# Patient Record
Sex: FEMALE
[De-identification: names, ages, dates, MRNs, and addresses within clinical notes are randomized per-mention and may not be internally consistent; named-entity substitution may affect disease eponyms.]

---

## 2023-09-15 ENCOUNTER — NURSE TRIAGE (OUTPATIENT)
Dept: OTHER | Facility: CLINIC | Age: 33
End: 2023-09-15

## 2023-09-15 NOTE — TELEPHONE ENCOUNTER
Location of patient: [place]    Received call from [address] at [facility]; Patient with Red Flag Complaint requesting to establish care with [facility]. Subjective: Caller states \" Back pain since December \" \"I had been taking muscle relaxers, gabapentin, and steroids. They tried injections but I have pain still\"    Current Symptoms:   States hard to stay in the same position  At times hands and feet tingling- states her previous PCP was not submitting paperwork to insurance appropriately and she is frustrated so looking for another provider    Onset: 9 months ago; gradual    Associated Symptoms: reduced activity    Pain Severity: 6/10; aching; waxing and waning    Temperature: no fever     What has been tried: lidocaine patches, lyrica, cymbalta, tizanidine    LMP:  July 25th  Pregnant: unknown    Recommended disposition: See in Office Within 2 Weeks    Care advice provided, patient verbalizes understanding; denies any other questions or concerns; instructed to call back for any new or worsening symptoms. Patient/Caller agrees with recommended disposition; writer provided warm transfer to [facility] [employer] [facility] [employer] at [facility] for appointment scheduling    Attention Provider: Thank you for allowing me to participate in the care of your patient. The patient was connected to triage in response to information provided to the [facility]. Please do not respond through this encounter as the response is not directed to a shared pool.       Reason for Disposition   Back pain is a chronic symptom (recurrent or ongoing AND lasting > 4 weeks)    Protocols used: Back Pain-ADULT-[place]

## 2023-09-18 ENCOUNTER — NURSE TRIAGE (OUTPATIENT)
Dept: OTHER | Facility: CLINIC | Age: 33
End: 2023-09-18

## 2023-09-18 SDOH — HEALTH STABILITY: PHYSICAL HEALTH: ON AVERAGE, HOW MANY DAYS PER WEEK DO YOU ENGAGE IN MODERATE TO STRENUOUS EXERCISE (LIKE A BRISK WALK)?: 4 DAYS

## 2023-09-18 SDOH — HEALTH STABILITY: PHYSICAL HEALTH: ON AVERAGE, HOW MANY MINUTES DO YOU ENGAGE IN EXERCISE AT THIS LEVEL?: 60 MIN

## 2023-09-18 ASSESSMENT — SOCIAL DETERMINANTS OF HEALTH (SDOH)
WITHIN THE LAST YEAR, HAVE YOU BEEN HUMILIATED OR EMOTIONALLY ABUSED IN OTHER WAYS BY YOUR PARTNER OR EX-PARTNER?: NO
WITHIN THE LAST YEAR, HAVE YOU BEEN AFRAID OF YOUR PARTNER OR EX-PARTNER?: NO
WITHIN THE LAST YEAR, HAVE TO BEEN RAPED OR FORCED TO HAVE ANY KIND OF SEXUAL ACTIVITY BY YOUR PARTNER OR EX-PARTNER?: NO
WITHIN THE LAST YEAR, HAVE YOU BEEN KICKED, HIT, SLAPPED, OR OTHERWISE PHYSICALLY HURT BY YOUR PARTNER OR EX-PARTNER?: NO

## 2023-09-18 NOTE — TELEPHONE ENCOUNTER
Location of patient: [place]    Received call from [facility] at [facility]; Patient with [employer] Complaint requesting to establish care with [place]. Current Symptoms: Mid back pain, dizziness, weakness in bilateral legs     States was evaluated by PT on 9/8/2023     Sana has been evaluated by other providers for similar symptoms and would like a new PCP    Onset: 10 months ago;     Pain Severity: 9/10    Temperature: denies     What has been tried: tylenol, ibuprofen, lidocaine patches, gabapentin, Cymbalta    Pregnant: No    Denies - fainting / numbness or weakness on one side of the body / visual changes / vomiting     Recommended disposition: See in Office Within 2 Weeks    Care advice provided, patient verbalizes understanding; denies any other questions or concerns; instructed to call back for any new or worsening symptoms. Patient/Caller agrees with recommended disposition; writer provided warm transfer to [facility] at [facility] for appointment scheduling    Attention Provider: Thank you for allowing me to participate in the care of your patient. The patient was connected to triage in response to information provided to the [facility]. Please do not respond through this encounter as the response is not directed to a shared pool.       Reason for Disposition   Dizziness not present now, but is a chronic symptom (recurrent or ongoing AND lasting > 4 weeks)    Protocols used: Dizziness-ADULT-[place]

## 2023-09-20 ENCOUNTER — OFFICE VISIT (OUTPATIENT)
Dept: FAMILY MEDICINE CLINIC | Age: 33
End: 2023-09-20
Payer: MEDICAID

## 2023-09-20 VITALS
HEART RATE: 81 BPM | WEIGHT: 107 LBS | SYSTOLIC BLOOD PRESSURE: 110 MMHG | DIASTOLIC BLOOD PRESSURE: 66 MMHG | OXYGEN SATURATION: 99 %

## 2023-09-20 DIAGNOSIS — M54.6 CHRONIC BILATERAL THORACIC BACK PAIN: Primary | ICD-10-CM

## 2023-09-20 DIAGNOSIS — G89.29 CHRONIC RADICULAR CERVICAL PAIN: ICD-10-CM

## 2023-09-20 DIAGNOSIS — G89.29 CHRONIC BILATERAL THORACIC BACK PAIN: Primary | ICD-10-CM

## 2023-09-20 DIAGNOSIS — M54.12 CHRONIC RADICULAR CERVICAL PAIN: ICD-10-CM

## 2023-09-20 PROCEDURE — G8421 BMI NOT CALCULATED: HCPCS

## 2023-09-20 PROCEDURE — 99204 OFFICE O/P NEW MOD 45 MIN: CPT

## 2023-09-20 PROCEDURE — 4004F PT TOBACCO SCREEN RCVD TLK: CPT

## 2023-09-20 PROCEDURE — G8427 DOCREV CUR MEDS BY ELIG CLIN: HCPCS

## 2023-09-20 RX ORDER — LIDOCAINE 50 MG/G
1 PATCH TOPICAL DAILY
COMMUNITY
Start: 2023-04-29

## 2023-09-20 RX ORDER — PREGABALIN 150 MG/1
150 CAPSULE ORAL 2 TIMES DAILY
COMMUNITY
Start: 2023-09-12

## 2023-09-20 RX ORDER — DULOXETIN HYDROCHLORIDE 30 MG/1
30 CAPSULE, DELAYED RELEASE ORAL DAILY
COMMUNITY
Start: 2023-08-29

## 2023-09-20 RX ORDER — TIZANIDINE 4 MG/1
4 TABLET ORAL DAILY
COMMUNITY
Start: 2023-08-29

## 2023-09-20 SDOH — ECONOMIC STABILITY: FOOD INSECURITY: WITHIN THE PAST 12 MONTHS, THE FOOD YOU BOUGHT JUST DIDN'T LAST AND YOU DIDN'T HAVE MONEY TO GET MORE.: NEVER TRUE

## 2023-09-20 SDOH — ECONOMIC STABILITY: FOOD INSECURITY: WITHIN THE PAST 12 MONTHS, YOU WORRIED THAT YOUR FOOD WOULD RUN OUT BEFORE YOU GOT MONEY TO BUY MORE.: NEVER TRUE

## 2023-09-20 SDOH — ECONOMIC STABILITY: INCOME INSECURITY: HOW HARD IS IT FOR YOU TO PAY FOR THE VERY BASICS LIKE FOOD, HOUSING, MEDICAL CARE, AND HEATING?: NOT HARD AT ALL

## 2023-09-20 SDOH — ECONOMIC STABILITY: HOUSING INSECURITY
IN THE LAST 12 MONTHS, WAS THERE A TIME WHEN YOU DID NOT HAVE A STEADY PLACE TO SLEEP OR SLEPT IN A SHELTER (INCLUDING NOW)?: NO

## 2023-09-20 ASSESSMENT — ENCOUNTER SYMPTOMS
SHORTNESS OF BREATH: 0
BACK PAIN: 1
ABDOMINAL PAIN: 0
COUGH: 0
CHEST TIGHTNESS: 0
BOWEL INCONTINENCE: 0

## 2023-09-20 NOTE — PROGRESS NOTES
[address] [facility] (:  1990) is a 35 y.o. female,Established patient, here for evaluation of the following chief complaint(s):  New Patient (Patient is here today to establish. She does have a history of issues with her spine. She did see Rheumatology and records are in care everywhere.) and Back Pain         ASSESSMENT/PLAN:  1. Chronic bilateral thoracic back pain  -     MRI THORACIC SPINE WO CONTRAST; Future  -     MRI CERVICAL SPINE WO CONTRAST; Future  2. Chronic radicular cervical pain  -     MRI CERVICAL SPINE WO CONTRAST; Future  -Here to establish, rheumatology as well as her prior PCP results are reviewed with her in office today. Does follow with rheumatology which is diagnosed her with she states ankylosing spondylitis however no imaging supported this just laboratory studies, did give her injections into her SI joints without any relief, her prior PCP wanted to do MRI of her T-spine and cervical due to her continuing complaints of pain in the area with referred numbness and tingling and paresthesia down bilateral arms. Motor function is not inhibited in any way, she has good strength bilateral hand grasps, deficits only include reports of paresthesia. Paresthesia is worse with some neck movement and range of motion both passive and active. Due to this will order MRI of cervical and thoracic spine. No follow-ups on file. Subjective   SUBJECTIVE/OBJECTIVE:  Back Pain  This is a chronic problem. The current episode started more than 1 month ago (december it became exacerbated). The problem occurs constantly. The problem is unchanged. The pain is present in the thoracic spine, lumbar spine and sacro-iliac. The quality of the pain is described as burning and stabbing. The pain radiates to the right thigh and left thigh. The pain is severe. The pain is The same all the time. The symptoms are aggravated by bending, twisting and stress.  Associated symptoms include numbness, paresthesias

## 2023-10-03 ENCOUNTER — HOSPITAL ENCOUNTER (OUTPATIENT)
Dept: MRI IMAGING | Age: 33
Discharge: HOME | End: 2023-10-05
Payer: MEDICAID

## 2023-10-03 DIAGNOSIS — G89.29 CHRONIC BILATERAL THORACIC BACK PAIN: ICD-10-CM

## 2023-10-03 DIAGNOSIS — G89.29 CHRONIC RADICULAR CERVICAL PAIN: ICD-10-CM

## 2023-10-03 DIAGNOSIS — M54.6 CHRONIC BILATERAL THORACIC BACK PAIN: ICD-10-CM

## 2023-10-03 DIAGNOSIS — M54.12 CHRONIC RADICULAR CERVICAL PAIN: ICD-10-CM

## 2023-10-03 PROCEDURE — 72146 MRI CHEST SPINE W/O DYE: CPT

## 2023-10-03 PROCEDURE — 72141 MRI NECK SPINE W/O DYE: CPT

## 2023-10-07 ASSESSMENT — PATIENT HEALTH QUESTIONNAIRE - PHQ9
5. POOR APPETITE OR OVEREATING: 3
2. FEELING DOWN, DEPRESSED OR HOPELESS: SEVERAL DAYS
4. FEELING TIRED OR HAVING LITTLE ENERGY: 3
2. FEELING DOWN, DEPRESSED OR HOPELESS: 1
10. IF YOU CHECKED OFF ANY PROBLEMS, HOW DIFFICULT HAVE THESE PROBLEMS MADE IT FOR YOU TO DO YOUR WORK, TAKE CARE OF THINGS AT HOME, OR GET ALONG WITH OTHER PEOPLE: 2
SUM OF ALL RESPONSES TO PHQ QUESTIONS 1-9: 14
6. FEELING BAD ABOUT YOURSELF - OR THAT YOU ARE A FAILURE OR HAVE LET YOURSELF OR YOUR FAMILY DOWN: 1
6. FEELING BAD ABOUT YOURSELF - OR THAT YOU ARE A FAILURE OR HAVE LET YOURSELF OR YOUR FAMILY DOWN: SEVERAL DAYS
7. TROUBLE CONCENTRATING ON THINGS, SUCH AS READING THE NEWSPAPER OR WATCHING TELEVISION: 1
9. THOUGHTS THAT YOU WOULD BE BETTER OFF DEAD, OR OF HURTING YOURSELF: 0
4. FEELING TIRED OR HAVING LITTLE ENERGY: NEARLY EVERY DAY
7. TROUBLE CONCENTRATING ON THINGS, SUCH AS READING THE NEWSPAPER OR WATCHING TELEVISION: SEVERAL DAYS
10. IF YOU CHECKED OFF ANY PROBLEMS, HOW DIFFICULT HAVE THESE PROBLEMS MADE IT FOR YOU TO DO YOUR WORK, TAKE CARE OF THINGS AT HOME, OR GET ALONG WITH OTHER PEOPLE: VERY DIFFICULT
3. TROUBLE FALLING OR STAYING ASLEEP: 2
SUM OF ALL RESPONSES TO PHQ9 QUESTIONS 1 & 2: 3
SUM OF ALL RESPONSES TO PHQ9 QUESTIONS 1 & 2: 3
8. MOVING OR SPEAKING SO SLOWLY THAT OTHER PEOPLE COULD HAVE NOTICED. OR THE OPPOSITE, BEING SO FIGETY OR RESTLESS THAT YOU HAVE BEEN MOVING AROUND A LOT MORE THAN USUAL: 1
1. LITTLE INTEREST OR PLEASURE IN DOING THINGS: MORE THAN HALF THE DAYS
9. THOUGHTS THAT YOU WOULD BE BETTER OFF DEAD, OR OF HURTING YOURSELF: NOT AT ALL
8. MOVING OR SPEAKING SO SLOWLY THAT OTHER PEOPLE COULD HAVE NOTICED. OR THE OPPOSITE - BEING SO FIDGETY OR RESTLESS THAT YOU HAVE BEEN MOVING AROUND A LOT MORE THAN USUAL: SEVERAL DAYS
5. POOR APPETITE OR OVEREATING: NEARLY EVERY DAY
SUM OF ALL RESPONSES TO PHQ QUESTIONS 1-9: 14
1. LITTLE INTEREST OR PLEASURE IN DOING THINGS: 2
SUM OF ALL RESPONSES TO PHQ QUESTIONS 1-9: 14
3. TROUBLE FALLING OR STAYING ASLEEP: MORE THAN HALF THE DAYS

## 2023-10-09 ENCOUNTER — OFFICE VISIT (OUTPATIENT)
Dept: FAMILY MEDICINE CLINIC | Age: 33
End: 2023-10-09
Payer: MEDICAID

## 2023-10-09 VITALS
DIASTOLIC BLOOD PRESSURE: 60 MMHG | OXYGEN SATURATION: 100 % | WEIGHT: 107 LBS | HEART RATE: 79 BPM | SYSTOLIC BLOOD PRESSURE: 120 MMHG

## 2023-10-09 DIAGNOSIS — M54.6 PAIN IN THORACIC SPINE AT MULTIPLE SITES: Primary | ICD-10-CM

## 2023-10-09 PROCEDURE — 4004F PT TOBACCO SCREEN RCVD TLK: CPT

## 2023-10-09 PROCEDURE — 99212 OFFICE O/P EST SF 10 MIN: CPT

## 2023-10-09 PROCEDURE — G8421 BMI NOT CALCULATED: HCPCS

## 2023-10-09 PROCEDURE — G8484 FLU IMMUNIZE NO ADMIN: HCPCS

## 2023-10-09 PROCEDURE — G8427 DOCREV CUR MEDS BY ELIG CLIN: HCPCS

## 2023-10-09 ASSESSMENT — ENCOUNTER SYMPTOMS
ABDOMINAL PAIN: 0
SHORTNESS OF BREATH: 0
BACK PAIN: 1
COUGH: 0

## 2023-10-09 NOTE — PROGRESS NOTES
[address] [facility] (:  1990) is a 35 y.o. female,Established patient, here for evaluation of the following chief complaint(s):  Back Pain (Patient is here today to follow up for MRI results. )         ASSESSMENT/PLAN:  1. Pain in thoracic spine at multiple sites  -     Tomasa Garcia, CNP, Pain Management, [place]  - normal MRI of neck and upper back. Despite this having chronic pain we will send to pain management for further treatment options. Return in about 6 months (around 2024), or if symptoms worsen or fail to improve. Subjective   SUBJECTIVE/OBJECTIVE:  Back Pain  This is a chronic problem. The current episode started more than 1 year ago. The problem occurs daily. The problem is unchanged. The pain is present in the lumbar spine and thoracic spine (cervical). The quality of the pain is described as aching. The pain is moderate. The pain is The same all the time. The symptoms are aggravated by bending, twisting, standing and stress. Pertinent negatives include no abdominal pain, bladder incontinence, bowel incontinence, chest pain, dysuria, fever, pelvic pain, perianal numbness or weakness. She has tried chiropractic manipulation, NSAIDs and muscle relaxant for the symptoms. The treatment provided mild relief. Review of Systems   Constitutional:  Negative for chills, fatigue and fever. HENT:  Negative for congestion. Respiratory:  Negative for cough and shortness of breath. Cardiovascular:  Negative for chest pain. Gastrointestinal:  Negative for abdominal pain and bowel incontinence. Genitourinary:  Negative for bladder incontinence, dysuria and pelvic pain. Musculoskeletal:  Positive for arthralgias, back pain and neck pain. Neurological:  Negative for weakness. Objective   Physical Exam  Vitals and nursing note reviewed. Constitutional:       Appearance: Normal appearance. HENT:      Head: Normocephalic.       Right Ear: Tympanic membrane and

## 2023-10-11 ASSESSMENT — ENCOUNTER SYMPTOMS: BOWEL INCONTINENCE: 0

## 2023-11-03 ENCOUNTER — TELEPHONE (OUTPATIENT)
Dept: FAMILY MEDICINE CLINIC | Age: 33
End: 2023-11-03

## 2023-11-03 NOTE — TELEPHONE ENCOUNTER
Remedios Esquivel, on behalf of [employer]erto Villanueva, is calling for a [id number] for an upcoming thoracic spine MRI. Please call [phone] for the peer to peer.

## 2023-11-06 NOTE — TELEPHONE ENCOUNTER
Spoke with the insurance again and they would like all PT notes faxed to them at [phone]. Tracking number [id number]. This calls reference number was [id number].

## 2023-11-06 NOTE — TELEPHONE ENCOUNTER
Spoke with the [facility] department and they did say it was retro denied and may still require a peer to peer. I then spoke with the number in the previous message. They were able to set up a peer to peer today that was done with Rakel Chapman.

## 2024-01-16 ENCOUNTER — OFFICE VISIT (OUTPATIENT)
Dept: FAMILY MEDICINE CLINIC | Age: 34
End: 2024-01-16
Payer: COMMERCIAL

## 2024-01-16 VITALS
TEMPERATURE: 97.8 F | DIASTOLIC BLOOD PRESSURE: 64 MMHG | OXYGEN SATURATION: 98 % | SYSTOLIC BLOOD PRESSURE: 122 MMHG | BODY MASS INDEX: 19.15 KG/M2 | WEIGHT: 112.2 LBS | RESPIRATION RATE: 16 BRPM | HEART RATE: 89 BPM | HEIGHT: 64 IN

## 2024-01-16 DIAGNOSIS — G89.29 CHRONIC PELVIC PAIN IN FEMALE: ICD-10-CM

## 2024-01-16 DIAGNOSIS — Z91.89 ENCOUNTER FOR HEPATITIS C VIRUS SCREENING TEST FOR HIGH RISK PATIENT: ICD-10-CM

## 2024-01-16 DIAGNOSIS — Z11.59 ENCOUNTER FOR HEPATITIS C VIRUS SCREENING TEST FOR HIGH RISK PATIENT: ICD-10-CM

## 2024-01-16 DIAGNOSIS — R10.2 CHRONIC PELVIC PAIN IN FEMALE: ICD-10-CM

## 2024-01-16 DIAGNOSIS — M54.6 PAIN IN THORACIC SPINE AT MULTIPLE SITES: ICD-10-CM

## 2024-01-16 DIAGNOSIS — M79.7 FIBROMYALGIA: ICD-10-CM

## 2024-01-16 DIAGNOSIS — K21.9 GASTROESOPHAGEAL REFLUX DISEASE WITHOUT ESOPHAGITIS: Primary | ICD-10-CM

## 2024-01-16 DIAGNOSIS — Z72.0 TOBACCO ABUSE: ICD-10-CM

## 2024-01-16 DIAGNOSIS — R59.1 LYMPHADENOPATHY: ICD-10-CM

## 2024-01-16 DIAGNOSIS — Z23 NEED FOR STREPTOCOCCUS PNEUMONIAE VACCINATION: ICD-10-CM

## 2024-01-16 DIAGNOSIS — R06.2 WHEEZING: ICD-10-CM

## 2024-01-16 DIAGNOSIS — F51.5 NIGHTMARES: ICD-10-CM

## 2024-01-16 PROCEDURE — G8427 DOCREV CUR MEDS BY ELIG CLIN: HCPCS | Performed by: NURSE PRACTITIONER

## 2024-01-16 PROCEDURE — G8420 CALC BMI NORM PARAMETERS: HCPCS | Performed by: NURSE PRACTITIONER

## 2024-01-16 PROCEDURE — 99212 OFFICE O/P EST SF 10 MIN: CPT | Performed by: NURSE PRACTITIONER

## 2024-01-16 PROCEDURE — 4004F PT TOBACCO SCREEN RCVD TLK: CPT | Performed by: NURSE PRACTITIONER

## 2024-01-16 PROCEDURE — 99214 OFFICE O/P EST MOD 30 MIN: CPT | Performed by: NURSE PRACTITIONER

## 2024-01-16 PROCEDURE — G8484 FLU IMMUNIZE NO ADMIN: HCPCS | Performed by: NURSE PRACTITIONER

## 2024-01-16 RX ORDER — MIRTAZAPINE 7.5 MG/1
7.5 TABLET, FILM COATED ORAL NIGHTLY
COMMUNITY
Start: 2023-12-29

## 2024-01-16 RX ORDER — PRAZOSIN HYDROCHLORIDE 1 MG/1
CAPSULE ORAL
COMMUNITY
Start: 2023-12-29

## 2024-01-16 RX ORDER — DULOXETIN HYDROCHLORIDE 60 MG/1
60 CAPSULE, DELAYED RELEASE ORAL DAILY
COMMUNITY
Start: 2024-01-04

## 2024-01-16 RX ORDER — NAPROXEN 500 MG/1
TABLET ORAL
COMMUNITY
Start: 2023-12-18

## 2024-01-16 RX ORDER — ALBUTEROL SULFATE 90 UG/1
2 AEROSOL, METERED RESPIRATORY (INHALATION) EVERY 4 HOURS PRN
Qty: 18 G | Refills: 0 | Status: SHIPPED | OUTPATIENT
Start: 2024-01-16

## 2024-01-16 RX ORDER — OMEPRAZOLE 40 MG/1
CAPSULE, DELAYED RELEASE ORAL
COMMUNITY

## 2024-01-16 ASSESSMENT — PATIENT HEALTH QUESTIONNAIRE - PHQ9
5. POOR APPETITE OR OVEREATING: 0
6. FEELING BAD ABOUT YOURSELF - OR THAT YOU ARE A FAILURE OR HAVE LET YOURSELF OR YOUR FAMILY DOWN: 1
10. IF YOU CHECKED OFF ANY PROBLEMS, HOW DIFFICULT HAVE THESE PROBLEMS MADE IT FOR YOU TO DO YOUR WORK, TAKE CARE OF THINGS AT HOME, OR GET ALONG WITH OTHER PEOPLE: 3
7. TROUBLE CONCENTRATING ON THINGS, SUCH AS READING THE NEWSPAPER OR WATCHING TELEVISION: 1
SUM OF ALL RESPONSES TO PHQ QUESTIONS 1-9: 10
8. MOVING OR SPEAKING SO SLOWLY THAT OTHER PEOPLE COULD HAVE NOTICED. OR THE OPPOSITE, BEING SO FIGETY OR RESTLESS THAT YOU HAVE BEEN MOVING AROUND A LOT MORE THAN USUAL: 1
3. TROUBLE FALLING OR STAYING ASLEEP: 3
SUM OF ALL RESPONSES TO PHQ QUESTIONS 1-9: 10
SUM OF ALL RESPONSES TO PHQ QUESTIONS 1-9: 10
1. LITTLE INTEREST OR PLEASURE IN DOING THINGS: 1
4. FEELING TIRED OR HAVING LITTLE ENERGY: 3
2. FEELING DOWN, DEPRESSED OR HOPELESS: 0
9. THOUGHTS THAT YOU WOULD BE BETTER OFF DEAD, OR OF HURTING YOURSELF: 0
SUM OF ALL RESPONSES TO PHQ QUESTIONS 1-9: 10
SUM OF ALL RESPONSES TO PHQ9 QUESTIONS 1 & 2: 1

## 2024-01-16 NOTE — PATIENT INSTRUCTIONS
Have labs done.  You will be called for an appointment from OBGYN.  Follow up with primary care provider in 1 to 2 days if needed.  Albuterol 2 puffs every 4 to 6 hours as needed for cough, wheeze or shortness of breath.

## 2024-01-16 NOTE — ASSESSMENT & PLAN NOTE
Uncontrolled, lifestyle modifications recommended and will start albuterol prn for wheezing.  Encouraged to stop smoking.

## 2024-01-16 NOTE — ASSESSMENT & PLAN NOTE
Normal cervical lymph tissue anteriorly and posteriorly.  Small 3 mm area of enlargement in the right groin.  No axillary adenopathy.

## 2024-01-16 NOTE — PROGRESS NOTES
Renetta Vasquez, APRN-CNP  [place] [id number]  [phone]        1/16/2024     Domitila Juarez is a 33 y.o. female, here for evaluation of the following medical concerns:    Chief Complaint   Patient presents with    Lymphadenopathy     Under left arm and groin.  Sx for 5 months- started with neck.        HPI  For 5 months she has had enlarged lymph nodes in the neck.  These were ultrasounded and was told they were reactive.  For the last week she has had some lymph swelling under the arms and in the inguinal region.  She is seeing a rheumatologist.  Diagnosed with Fibromyalgia.  Question of Sjogren's.     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  Patient sees rheumatology, ENT and psychiatry.    She also has chronic pelvic pain.  She is unhappy with her GYN and would like to change to another provider.  Patient Active Problem List   Diagnosis    Pain in thoracic spine at multiple sites    Lymphadenopathy    Gastroesophageal reflux disease without esophagitis    Fibromyalgia    Nightmares    Tobacco abuse    Chronic pelvic pain in female       Patient's recent lab reports are as follows:      Results for orders placed or performed in visit on 11/16/23   Pregnancy, Urine   Result Value Ref Range    HCG Urine Negative Negative     Other review of systems are as noted below.    Preventative measures are reviewed today. See health maintenance section for complete preventative plan of care.    Did review patient's med list, allergies, social history, fam history, pmhx and pshx today as noted in the record.      Review of Systems      Prior to Visit Medications    Medication Sig Taking? Authorizing Provider   DULoxetine (CYMBALTA) 60 MG extended release capsule Take 1 capsule by mouth daily Yes Provider, Cleve, MD   omeprazole (PRILOSEC) 40 MG delayed release capsule take 1 capsule by mouth 30 MINUTES before LUNCH or dinner daily Yes Provider, Historical,  no

## 2024-01-16 NOTE — ASSESSMENT & PLAN NOTE
Uncontrolled, patient is unsatisfied with her current provider.  [facility]

## 2024-02-06 ENCOUNTER — OFFICE VISIT (OUTPATIENT)
Dept: FAMILY MEDICINE CLINIC | Age: 34
End: 2024-02-06
Payer: COMMERCIAL

## 2024-02-06 VITALS
DIASTOLIC BLOOD PRESSURE: 64 MMHG | SYSTOLIC BLOOD PRESSURE: 120 MMHG | BODY MASS INDEX: 20.05 KG/M2 | WEIGHT: 115 LBS | OXYGEN SATURATION: 98 % | HEART RATE: 86 BPM

## 2024-02-06 DIAGNOSIS — R59.9 REACTIVE LYMPHADENOPATHY: ICD-10-CM

## 2024-02-06 DIAGNOSIS — M79.7 FIBROMYALGIA: Primary | ICD-10-CM

## 2024-02-06 DIAGNOSIS — M54.6 PAIN IN THORACIC SPINE AT MULTIPLE SITES: ICD-10-CM

## 2024-02-06 PROCEDURE — G8427 DOCREV CUR MEDS BY ELIG CLIN: HCPCS | Performed by: NURSE PRACTITIONER

## 2024-02-06 PROCEDURE — 4004F PT TOBACCO SCREEN RCVD TLK: CPT | Performed by: NURSE PRACTITIONER

## 2024-02-06 PROCEDURE — G8484 FLU IMMUNIZE NO ADMIN: HCPCS | Performed by: NURSE PRACTITIONER

## 2024-02-06 PROCEDURE — G8420 CALC BMI NORM PARAMETERS: HCPCS | Performed by: NURSE PRACTITIONER

## 2024-02-06 PROCEDURE — 99212 OFFICE O/P EST SF 10 MIN: CPT | Performed by: NURSE PRACTITIONER

## 2024-02-06 PROCEDURE — 99214 OFFICE O/P EST MOD 30 MIN: CPT | Performed by: NURSE PRACTITIONER

## 2024-02-06 RX ORDER — PREDNISONE 20 MG/1
40 TABLET ORAL EVERY MORNING
COMMUNITY
Start: 2024-02-02

## 2024-02-06 ASSESSMENT — ENCOUNTER SYMPTOMS: BACK PAIN: 1

## 2024-02-06 NOTE — PROGRESS NOTES
Readings from Last 3 Encounters:   No data found for PF     @LASTSAO2(3)@   Estimated body mass index is 20.05 kg/m² as calculated from the following:    Height as of 1/16/24: 1.613 m (5' 3.5\").    Weight as of this encounter: 52.2 kg (115 lb).       1/16/2024     9:29 AM 10/7/2023    12:03 PM   PHQ-9    Little interest or pleasure in doing things 1 2   Feeling down, depressed, or hopeless 0 1   Trouble falling or staying asleep, or sleeping too much 3 2   Feeling tired or having little energy 3 3   Poor appetite or overeating 0 3   Feeling bad about yourself - or that you are a failure or have let yourself or your family down 1 1   Trouble concentrating on things, such as reading the newspaper or watching television 1 1   Moving or speaking so slowly that other people could have noticed. Or the opposite - being so fidgety or restless that you have been moving around a lot more than usual 1 1   Thoughts that you would be better off dead, or of hurting yourself in some way 0 0   PHQ-2 Score 1 3   PHQ-9 Total Score 10 14   If you checked off any problems, how difficult have these problems made it for you to do your work, take care of things at home, or get along with other people? 3 2           No data to display                   Physical Exam  Vitals and nursing note reviewed.   Constitutional:       Appearance: Normal appearance.   HENT:      Head: Normocephalic.      Right Ear: External ear normal.      Left Ear: External ear normal.   Eyes:      Extraocular Movements: Extraocular movements intact.   Pulmonary:      Effort: Pulmonary effort is normal. No respiratory distress.   Neurological:      General: No focal deficit present.      Mental Status: She is alert and oriented to person, place, and time.           ASSESSMENT/PLAN:    1. Fibromyalgia  -     Josias Castañeda MD, Rheumatology, [place]  2. Reactive lymphadenopathy  -     Josias Castañeda MD, Rheumatology, [place]  3. Pain in thoracic spine at multiple

## 2024-02-08 ENCOUNTER — HOSPITAL ENCOUNTER (OUTPATIENT)
Age: 34
Discharge: HOME | End: 2024-02-08
Payer: COMMERCIAL

## 2024-02-08 ENCOUNTER — OFFICE VISIT (OUTPATIENT)
Dept: OBGYN | Age: 34
End: 2024-02-08
Payer: COMMERCIAL

## 2024-02-08 VITALS
OXYGEN SATURATION: 98 % | HEART RATE: 113 BPM | HEIGHT: 63 IN | SYSTOLIC BLOOD PRESSURE: 124 MMHG | DIASTOLIC BLOOD PRESSURE: 88 MMHG | RESPIRATION RATE: 14 BRPM | BODY MASS INDEX: 20.41 KG/M2 | WEIGHT: 115.2 LBS

## 2024-02-08 DIAGNOSIS — R10.2 PELVIC PAIN: ICD-10-CM

## 2024-02-08 DIAGNOSIS — Z86.19 HISTORY OF EPSTEIN-BARR VIRUS INFECTION: ICD-10-CM

## 2024-02-08 DIAGNOSIS — R10.2 PELVIC PAIN: Primary | ICD-10-CM

## 2024-02-08 DIAGNOSIS — Z86.19 HISTORY OF CHLAMYDIA INFECTION: ICD-10-CM

## 2024-02-08 DIAGNOSIS — Z72.0 TOBACCO ABUSE: ICD-10-CM

## 2024-02-08 LAB
BILIRUB UR QL STRIP: NEGATIVE
CLARITY UR: CLEAR
COLOR UR: YELLOW
COMMENT: ABNORMAL
GLUCOSE UR STRIP-MCNC: NEGATIVE MG/DL
HGB UR QL STRIP.AUTO: NEGATIVE
KETONES UR STRIP-MCNC: NEGATIVE MG/DL
LEUKOCYTE ESTERASE UR QL STRIP: NEGATIVE
NITRITE UR QL STRIP: NEGATIVE
PH UR STRIP: 7 [PH] (ref 5–6)
PROT UR STRIP-MCNC: NEGATIVE MG/DL
SP GR UR STRIP: 1.01 (ref 1.01–1.02)
UROBILINOGEN UR STRIP-ACNC: NORMAL EU/DL (ref 0–1)

## 2024-02-08 PROCEDURE — G8484 FLU IMMUNIZE NO ADMIN: HCPCS | Performed by: OBSTETRICS & GYNECOLOGY

## 2024-02-08 PROCEDURE — G8427 DOCREV CUR MEDS BY ELIG CLIN: HCPCS | Performed by: OBSTETRICS & GYNECOLOGY

## 2024-02-08 PROCEDURE — 99203 OFFICE O/P NEW LOW 30 MIN: CPT | Performed by: OBSTETRICS & GYNECOLOGY

## 2024-02-08 PROCEDURE — G8420 CALC BMI NORM PARAMETERS: HCPCS | Performed by: OBSTETRICS & GYNECOLOGY

## 2024-02-08 PROCEDURE — 81003 URINALYSIS AUTO W/O SCOPE: CPT

## 2024-02-08 PROCEDURE — 4004F PT TOBACCO SCREEN RCVD TLK: CPT | Performed by: OBSTETRICS & GYNECOLOGY

## 2024-02-08 PROCEDURE — 99214 OFFICE O/P EST MOD 30 MIN: CPT | Performed by: OBSTETRICS & GYNECOLOGY

## 2024-02-08 ASSESSMENT — PATIENT HEALTH QUESTIONNAIRE - PHQ9
1. LITTLE INTEREST OR PLEASURE IN DOING THINGS: 0
SUM OF ALL RESPONSES TO PHQ QUESTIONS 1-9: 0
SUM OF ALL RESPONSES TO PHQ9 QUESTIONS 1 & 2: 0
2. FEELING DOWN, DEPRESSED OR HOPELESS: 0
SUM OF ALL RESPONSES TO PHQ QUESTIONS 1-9: 0
SUM OF ALL RESPONSES TO PHQ QUESTIONS 1-9: 0

## 2024-02-08 NOTE — PROGRESS NOTES
follow-up.  No orders of the defined types were placed in this encounter.          The patient, Domitila Juarez is a 33 y.o. female, was seen with a total time spent of *** minutes for the visit on this date of service by the E/M provider. The time component had both face to face and non face to face time spent in determining the total time component.  Counseling and education regarding her diagnosis listed below and her options regarding those diagnoses were also included in determining her time component.     {No diagnosis found. (Refresh or delete this SmartLink)}     The patient had her preventative health maintenance recommendations and follow-up reviewed with her at the completion of her visit.    
her visit.    
Patient states no history

## 2024-02-09 ENCOUNTER — TELEPHONE (OUTPATIENT)
Dept: FAMILY MEDICINE CLINIC | Age: 34
End: 2024-02-09

## 2024-02-09 NOTE — TELEPHONE ENCOUNTER
Patient says it is for a biopsy of her lymphnode.     ----- Message from Kayleigh Wesley sent at 2/9/2024  1:59 PM EST -----  Subject: Referral Request    Reason for referral request? General Surgeon  Provider patient wants to be referred to(if known):     Provider Phone Number(if known):[phone]    Additional Information for Provider? [facility] General Surgery [address] [place] [address], [facility], [place], [address],   fax [phone]  ---------------------------------------------------------------------------  --------------  CALL BACK INFO    [phone]; OK to leave message on voicemail  ---------------------------------------------------------------------------  --------------

## 2024-02-09 NOTE — TELEPHONE ENCOUNTER
Left voicemail for patient to let us know what referral is for.       ----- Message from Kayleigh Wesley sent at 2/9/2024  1:59 PM EST -----  Subject: Referral Request    Reason for referral request? General Surgeon  Provider patient wants to be referred to(if known):     Provider Phone Number(if known):[phone]    Additional Information for Provider? [facility] General Surgery [address] [place] [address], [facility], [place], [address],   fax [phone]  ---------------------------------------------------------------------------  --------------  CALL BACK INFO    [phone]; OK to leave message on voicemail  ---------------------------------------------------------------------------  --------------

## 2024-02-19 ENCOUNTER — TELEPHONE (OUTPATIENT)
Dept: OBGYN | Age: 34
End: 2024-02-19

## 2024-02-21 NOTE — TELEPHONE ENCOUNTER
Have not received records from [place]. Only records received were from [facility] and it was for a groin US and STI labs.     Please call patient and give fax number to see if she is able to get records sent.     
If we do not get records please call patient. Patient has a lot of medical concerns at the moment and is wanting to make sure that her next appointment is productive. Office only received medical records from [facility]. Patient had stated something about getting records from [place].   
Writer LM with [facility] to get patients recent pap results faxed to the office. If fax is not received by end of day, writer will call patient to reschedule for next week, should patient choose.  
Instructions: This plan will send the code FBSE to the PM system.  DO NOT or CHANGE the price.
Detail Level: Generalized
Price (Do Not Change): 0.00

## 2024-02-22 ENCOUNTER — HOSPITAL ENCOUNTER (OUTPATIENT)
Age: 34
Discharge: HOME | End: 2024-02-22
Payer: COMMERCIAL

## 2024-02-22 ENCOUNTER — OFFICE VISIT (OUTPATIENT)
Dept: OBGYN | Age: 34
End: 2024-02-22
Payer: COMMERCIAL

## 2024-02-22 VITALS
OXYGEN SATURATION: 99 % | SYSTOLIC BLOOD PRESSURE: 118 MMHG | HEART RATE: 105 BPM | WEIGHT: 116.6 LBS | DIASTOLIC BLOOD PRESSURE: 62 MMHG | BODY MASS INDEX: 21.46 KG/M2 | HEIGHT: 62 IN

## 2024-02-22 DIAGNOSIS — Z12.4 ENCOUNTER FOR PAPANICOLAOU SMEAR FOR CERVICAL CANCER SCREENING: ICD-10-CM

## 2024-02-22 DIAGNOSIS — R10.2 PELVIC PAIN: ICD-10-CM

## 2024-02-22 DIAGNOSIS — N89.8 VAGINAL DISCHARGE: ICD-10-CM

## 2024-02-22 DIAGNOSIS — E05.90 HYPERTHYROIDISM: ICD-10-CM

## 2024-02-22 DIAGNOSIS — R10.2 PELVIC PAIN: Primary | ICD-10-CM

## 2024-02-22 LAB
B-HCG SERPL EIA 3RD IS-ACNC: 1699 MIU/ML
CANDIDA SPECIES: NEGATIVE
GARDNERELLA VAGINALIS: POSITIVE
SOURCE: ABNORMAL
TRICHOMONAS: NEGATIVE
TSH SERPL DL<=0.05 MIU/L-ACNC: 1.09 UIU/ML (ref 0.3–5)

## 2024-02-22 PROCEDURE — 4004F PT TOBACCO SCREEN RCVD TLK: CPT | Performed by: OBSTETRICS & GYNECOLOGY

## 2024-02-22 PROCEDURE — 87660 TRICHOMONAS VAGIN DIR PROBE: CPT

## 2024-02-22 PROCEDURE — 36415 COLL VENOUS BLD VENIPUNCTURE: CPT

## 2024-02-22 PROCEDURE — 87624 HPV HI-RISK TYP POOLED RSLT: CPT

## 2024-02-22 PROCEDURE — 84443 ASSAY THYROID STIM HORMONE: CPT

## 2024-02-22 PROCEDURE — G8484 FLU IMMUNIZE NO ADMIN: HCPCS | Performed by: OBSTETRICS & GYNECOLOGY

## 2024-02-22 PROCEDURE — 84702 CHORIONIC GONADOTROPIN TEST: CPT

## 2024-02-22 PROCEDURE — G8420 CALC BMI NORM PARAMETERS: HCPCS | Performed by: OBSTETRICS & GYNECOLOGY

## 2024-02-22 PROCEDURE — G8427 DOCREV CUR MEDS BY ELIG CLIN: HCPCS | Performed by: OBSTETRICS & GYNECOLOGY

## 2024-02-22 PROCEDURE — 99214 OFFICE O/P EST MOD 30 MIN: CPT | Performed by: OBSTETRICS & GYNECOLOGY

## 2024-02-22 PROCEDURE — 87510 GARDNER VAG DNA DIR PROBE: CPT

## 2024-02-22 PROCEDURE — 87480 CANDIDA DNA DIR PROBE: CPT | Performed by: OBSTETRICS & GYNECOLOGY

## 2024-02-22 PROCEDURE — 87480 CANDIDA DNA DIR PROBE: CPT

## 2024-02-22 PROCEDURE — 87491 CHLMYD TRACH DNA AMP PROBE: CPT

## 2024-02-22 PROCEDURE — 87801 DETECT AGNT MULT DNA AMPLI: CPT | Performed by: OBSTETRICS & GYNECOLOGY

## 2024-02-22 PROCEDURE — 87591 N.GONORRHOEAE DNA AMP PROB: CPT

## 2024-02-22 PROCEDURE — G0145 SCR C/V CYTO,THINLAYER,RESCR: HCPCS

## 2024-02-22 PROCEDURE — 99214 OFFICE O/P EST MOD 30 MIN: CPT

## 2024-02-22 ASSESSMENT — PATIENT HEALTH QUESTIONNAIRE - PHQ9
SUM OF ALL RESPONSES TO PHQ QUESTIONS 1-9: 0
SUM OF ALL RESPONSES TO PHQ QUESTIONS 1-9: 0
2. FEELING DOWN, DEPRESSED OR HOPELESS: 0
1. LITTLE INTEREST OR PLEASURE IN DOING THINGS: 0
SUM OF ALL RESPONSES TO PHQ QUESTIONS 1-9: 0
SUM OF ALL RESPONSES TO PHQ QUESTIONS 1-9: 0
SUM OF ALL RESPONSES TO PHQ9 QUESTIONS 1 & 2: 0

## 2024-02-22 NOTE — PROGRESS NOTES
Domitila Juarez  2024      Domitila Juarez is a 33 y.o. female       The patient was seen today. She was here to follow-up regarding her labs and diagnostics ordered at her last visit for the diagnosis of:    ICD-10-CM    1. Pelvic pain  R10.2 US PELVIS COMPLETE NON-OB TRANSABDOMINAL AND TRANSVAGINAL      2. Encounter for Papanicolaou smear for cervical cancer screening  Z12.4 PAP SMEAR      3. Vaginal discharge  N89.8 Vaginitis DNA Probe     C.trachomatis N.gonorrhoeae DNA      4. Hyperthyroidism  E05.90 TSH with Reflex          Her bowels are regular and she is voiding without difficulty.       Past Medical History:   Diagnosis Date    ADHD (attention deficit hyperactivity disorder)     Chlamydia     Chronic back pain     Depression     Fibromyalgia     Hyperthyroidism     Restless legs syndrome     STD (sexually transmitted disease)     Trauma          Past Surgical History:   Procedure Laterality Date    LAPAROSCOPY      [place]-Never found out results    ULNAR COLLATERAL LIGAMENT RECONSTRUCTION           Family History   Problem Relation Age of Onset    Other Mother         bursitis, spondylitis    Other Paternal Grandmother         Dementia, MI, CVA    Other Paternal Grandfather         copd    Diabetes Paternal Grandfather          Social History     Tobacco Use    Smoking status: Every Day     Current packs/day: 0.25     Average packs/day: 0.3 packs/day for 5.1 years (1.3 ttl pk-yrs)     Types: Cigarettes     Start date: 2019    Smokeless tobacco: Never   Vaping Use    Vaping Use: Some days   Substance Use Topics    Alcohol use: Not Currently    Drug use: Yes     Types: Marijuana (Weed)         MEDICATIONS:  Current Outpatient Medications   Medication Sig Dispense Refill    predniSONE (DELTASONE) 20 MG tablet Take 2 tablets by mouth every morning      DULoxetine (CYMBALTA) 60 MG extended release capsule Take 1 capsule by mouth daily      omeprazole (PRILOSEC) 40 MG delayed release

## 2024-02-23 ENCOUNTER — HOSPITAL ENCOUNTER (OUTPATIENT)
Dept: ULTRASOUND IMAGING | Age: 34
End: 2024-02-23
Attending: OBSTETRICS & GYNECOLOGY
Payer: COMMERCIAL

## 2024-02-23 DIAGNOSIS — R10.2 PELVIC PAIN: ICD-10-CM

## 2024-02-23 LAB
C TRACH DNA SPEC QL PROBE+SIG AMP: NEGATIVE
N GONORRHOEA DNA SPEC QL PROBE+SIG AMP: NEGATIVE
SPECIMEN DESCRIPTION: NORMAL

## 2024-02-23 PROCEDURE — 76856 US EXAM PELVIC COMPLETE: CPT

## 2024-02-24 LAB
HPV I/H RISK 4 DNA CVX QL NAA+PROBE: DETECTED
HPV SAMPLE: ABNORMAL
HPV, INTERPRETATION: ABNORMAL
HPV16 DNA CVX QL NAA+PROBE: NOT DETECTED
HPV18 DNA CVX QL NAA+PROBE: NOT DETECTED
SPECIMEN DESCRIPTION: ABNORMAL

## 2024-02-27 ENCOUNTER — HOSPITAL ENCOUNTER (OUTPATIENT)
Dept: ULTRASOUND IMAGING | Age: 34
Discharge: HOME | End: 2024-02-29
Payer: COMMERCIAL

## 2024-02-27 ENCOUNTER — INITIAL PRENATAL (OUTPATIENT)
Dept: OBGYN | Age: 34
End: 2024-02-27
Payer: COMMERCIAL

## 2024-02-27 ENCOUNTER — HOSPITAL ENCOUNTER (OUTPATIENT)
Age: 34
Discharge: HOME | End: 2024-02-27
Payer: COMMERCIAL

## 2024-02-27 VITALS
DIASTOLIC BLOOD PRESSURE: 64 MMHG | SYSTOLIC BLOOD PRESSURE: 102 MMHG | BODY MASS INDEX: 19.81 KG/M2 | HEIGHT: 64 IN | WEIGHT: 116 LBS | HEART RATE: 103 BPM

## 2024-02-27 DIAGNOSIS — Z34.90 EARLY STAGE OF PREGNANCY: Primary | ICD-10-CM

## 2024-02-27 DIAGNOSIS — Z34.90 EARLY STAGE OF PREGNANCY: ICD-10-CM

## 2024-02-27 LAB
AMPHET UR QL SCN: NEGATIVE
B-HCG SERPL EIA 3RD IS-ACNC: ABNORMAL MIU/ML
BACTERIA URNS QL MICRO: ABNORMAL
BARBITURATES UR QL SCN: NEGATIVE
BENZODIAZ UR QL: NEGATIVE
BILIRUB UR QL STRIP: NEGATIVE
CANNABINOIDS UR QL SCN: POSITIVE
CHARACTER UR: ABNORMAL
CLARITY UR: CLEAR
COCAINE UR QL SCN: NEGATIVE
COLOR UR: YELLOW
EPI CELLS #/AREA URNS HPF: ABNORMAL /HPF (ref 0–5)
FENTANYL UR QL: NEGATIVE
GLUCOSE UR STRIP-MCNC: NEGATIVE MG/DL
HGB UR QL STRIP.AUTO: ABNORMAL
KETONES UR STRIP-MCNC: NEGATIVE MG/DL
LEUKOCYTE ESTERASE UR QL STRIP: ABNORMAL
METHADONE UR QL: NEGATIVE
NITRITE UR QL STRIP: NEGATIVE
OPIATES UR QL SCN: NEGATIVE
OXYCODONE UR QL SCN: NEGATIVE
PCP UR QL SCN: NEGATIVE
PH UR STRIP: 6 [PH] (ref 5–6)
PROT UR STRIP-MCNC: NEGATIVE MG/DL
RBC #/AREA URNS HPF: ABNORMAL /HPF (ref 0–4)
SP GR UR STRIP: 1.02 (ref 1.01–1.02)
TEST INFORMATION: ABNORMAL
UROBILINOGEN UR STRIP-ACNC: NORMAL EU/DL (ref 0–1)
WBC #/AREA URNS HPF: ABNORMAL /HPF (ref 0–4)

## 2024-02-27 PROCEDURE — G8427 DOCREV CUR MEDS BY ELIG CLIN: HCPCS | Performed by: ADVANCED PRACTICE MIDWIFE

## 2024-02-27 PROCEDURE — 76817 TRANSVAGINAL US OBSTETRIC: CPT

## 2024-02-27 PROCEDURE — 87591 N.GONORRHOEAE DNA AMP PROB: CPT

## 2024-02-27 PROCEDURE — 81001 URINALYSIS AUTO W/SCOPE: CPT

## 2024-02-27 PROCEDURE — 87086 URINE CULTURE/COLONY COUNT: CPT

## 2024-02-27 PROCEDURE — 1036F TOBACCO NON-USER: CPT | Performed by: ADVANCED PRACTICE MIDWIFE

## 2024-02-27 PROCEDURE — 99214 OFFICE O/P EST MOD 30 MIN: CPT | Performed by: ADVANCED PRACTICE MIDWIFE

## 2024-02-27 PROCEDURE — 84702 CHORIONIC GONADOTROPIN TEST: CPT

## 2024-02-27 PROCEDURE — 36415 COLL VENOUS BLD VENIPUNCTURE: CPT

## 2024-02-27 PROCEDURE — G8484 FLU IMMUNIZE NO ADMIN: HCPCS | Performed by: ADVANCED PRACTICE MIDWIFE

## 2024-02-27 PROCEDURE — 80307 DRUG TEST PRSMV CHEM ANLYZR: CPT

## 2024-02-27 PROCEDURE — 87491 CHLMYD TRACH DNA AMP PROBE: CPT

## 2024-02-27 PROCEDURE — G8420 CALC BMI NORM PARAMETERS: HCPCS | Performed by: ADVANCED PRACTICE MIDWIFE

## 2024-02-27 RX ORDER — ACETAMINOPHEN 500 MG
500 TABLET ORAL EVERY 6 HOURS PRN
COMMUNITY

## 2024-02-27 RX ORDER — FAMOTIDINE 20 MG
1 TABLET ORAL DAILY
Qty: 90 TABLET | Refills: 4 | Status: SHIPPED | OUTPATIENT
Start: 2024-02-27 | End: 2025-02-26

## 2024-02-27 NOTE — PROGRESS NOTES
General: Skin is warm and dry.   Neurological:      Mental Status: She is alert and oriented to person, place, and time.      Deep Tendon Reflexes: Reflexes are normal and symmetric.       Component  Ref Range & Units 2/27/24 1148 2/22/24 1328   hCG Quant  <5 mIU/mL 12,102.0 High  1,699.0 High    02/23/2024     HISTORY:  ORDERING SYSTEM PROVIDED HISTORY: Early stage of pregnancy  TECHNOLOGIST PROVIDED HISTORY:     previous US questionable interuterine gestational sac  Reason for Exam: questionable gs on previous u/s     FINDINGS:  Uterus: Measures 7.2 x 4.6 x 4.3 cm     Cervical length: Not measured     Gestational Sac(s):  There is a single intrauterine gestational sac evident,  with mean sac diameter measuring 8.1 mm.  However, there is no current  identifiable fetal pole or yolk sac.     Yolk Sac:  Not identified     Fetal Pole:  Not identified     Elverta Rump Length:  Not applicable     Fetal Heart Rate:  Not applicable     Right ovary: Measures 4.1 x 3.0 x 2.3 cm     Left ovary: Measures 3.4 x 2.4 x 1.9 cm     Both ovaries demonstrate normal arterial and venous Doppler flow, without  evidence of torsion, mass, or ectopic pregnancy.  There is an incidental 2.8  x 2.2 x 1.7 cm right ovarian corpus luteum.     Free fluid: There is a mild amount of free fluid within cul-de-sac.     Measurements:     Estimated gestational age by current ultrasound: N/a     Estimated gestational age by LMP/prior ultrasound: 5 weeks 2 days     Estimated Due Date: 10/27/2024 by LMP     IMPRESSION:  1. Single intrauterine gestational sac, measuring 8.1 mm in diameter, though  no current identifiable fetal pole or yolk sac.  However, it may be too early  in pregnancy to detect these structures, and this could represent an early  normal pregnancy.  A failed pregnancy is also a consideration.  Suggest  continued appropriate clinical treatment, beta HCG follow-up, and short-term  follow-up fetal ultrasound to ensure normal progression of

## 2024-02-28 LAB
CHLAMYDIA DNA UR QL NAA+PROBE: NEGATIVE
MICROORGANISM SPEC CULT: NORMAL
N GONORRHOEA DNA UR QL NAA+PROBE: NEGATIVE
SPECIMEN DESCRIPTION: NORMAL
SPECIMEN DESCRIPTION: NORMAL

## 2024-02-28 NOTE — RESULT ENCOUNTER NOTE
Please notify patient lab results WNL. She was previously counseled to stop THC and reports she did stop with knowledge of pregnancy. Repeat US in 2 weeks as previous. DA/GTM

## 2024-03-07 ENCOUNTER — TELEPHONE (OUTPATIENT)
Dept: OBGYN CLINIC | Age: 34
End: 2024-03-07

## 2024-03-07 DIAGNOSIS — Z34.90 EARLY STAGE OF PREGNANCY: Primary | ICD-10-CM

## 2024-03-07 NOTE — TELEPHONE ENCOUNTER
Patient called to the office, she saw  in [place] but is transferring to the [place] location, patient needs updated order from our office to complete ob sono at the hospital prior to intake appointment.

## 2024-03-15 LAB — CYTOLOGY REPORT: NORMAL

## 2024-11-04 ENCOUNTER — TELEPHONE (OUTPATIENT)
Dept: OBGYN | Age: 34
End: 2024-11-04

## 2024-11-04 NOTE — TELEPHONE ENCOUNTER
----- Message from Catia FAN sent at 10/31/2024  5:14 PM EDT -----  Regarding: FW: Need medical records  Can you please get this for me?  Then forward to billing. Thanks. KRISHNA  ----- Message -----  From: Odilia Ambriz  Sent: 10/31/2024   9:34 AM EDT  To: LEIGH Ruiz CNM  Subject: Need medical records                             Good morning, Dr. Ashley.  We have a charge for this patient for service date 7/1/24 for an fetal monitoring performed at [facility].   We are unable to locate the notes in Epic.  Can you please have someone scan the notes into Media so we can appeal the denial we received for this service?  We appreciate your assistance.    Thank you,   Odilia Ambriz  Physician Coding Supervisor  [email or web address]